# Patient Record
(demographics unavailable — no encounter records)

---

## 2025-07-15 NOTE — END OF VISIT
[FreeTextEntry3] : I, Hermelinda Martines, acted as a scribe on behalf of Dr. Corrine Webster M.D. on 07/15/2025.    All medical entries made by the scribe were at my Dr. Corrine Webster M.D direction and personally dictated by me on 07/15/2025. I have reviewed the chart and agree that the record accurately reflects my personal performance of the history, physical exam, assessment and plan. I have also personally directed, reviewed, and agreed with the chart.

## 2025-07-15 NOTE — HISTORY OF PRESENT ILLNESS
[Patient reported mammogram was normal] : Patient reported mammogram was normal [Patient reported PAP Smear was normal] : Patient reported PAP Smear was normal [Patient reported colonoscopy was normal] : Patient reported colonoscopy was normal [FreeTextEntry1] : 76 y/o female presents for annual visit. She is doing well. Pt reports leakage of urine.   ObHx: MAbx1,  FTx2 (twinsx1), TOPx2 GYNHx: h/o JOEY PMHx: Depression SHx: spinal fusion, JOEY/rectocele repair, cataracts, breast implants, breast implant removal, abdominoplasty FHx: Breast CA (GM @ 55y/o)  [Mammogramdate] : 12/2023 [PapSmeardate] : 02/2024 [BoneDensityDate] : 12/2023 [ColonoscopyDate] : 2019

## 2025-07-15 NOTE — PLAN
[FreeTextEntry1] : 77 year old female for annual visit.   Plan: - Pap/HPV done today   - Rx given for Mammo and Dexa - Rx given for Colon - Referral for Dr. Shah given  - RTO 1 year